# Patient Record
Sex: MALE | Race: WHITE | Employment: UNEMPLOYED | RURAL
[De-identification: names, ages, dates, MRNs, and addresses within clinical notes are randomized per-mention and may not be internally consistent; named-entity substitution may affect disease eponyms.]

---

## 2024-06-02 ENCOUNTER — HOSPITAL ENCOUNTER (INPATIENT)
Facility: HOSPITAL | Age: 39
LOS: 1 days | Discharge: HOME | DRG: 291 | End: 2024-06-06
Attending: EMERGENCY MEDICINE | Admitting: INTERNAL MEDICINE

## 2024-06-02 DIAGNOSIS — I50.9 ACUTE ON CHRONIC CONGESTIVE HEART FAILURE, UNSPECIFIED HEART FAILURE TYPE (HCC): ICD-10-CM

## 2024-06-02 DIAGNOSIS — R00.0 TACHYCARDIA: ICD-10-CM

## 2024-06-02 DIAGNOSIS — I50.9 CONGESTIVE HEART FAILURE, UNSPECIFIED HF CHRONICITY, UNSPECIFIED HEART FAILURE TYPE (HCC): Primary | ICD-10-CM

## 2024-06-02 PROCEDURE — 84484 ASSAY OF TROPONIN QUANT: CPT

## 2024-06-02 PROCEDURE — 93005 ELECTROCARDIOGRAM TRACING: CPT | Performed by: EMERGENCY MEDICINE

## 2024-06-02 PROCEDURE — 83880 ASSAY OF NATRIURETIC PEPTIDE: CPT

## 2024-06-02 PROCEDURE — 80053 COMPREHEN METABOLIC PANEL: CPT

## 2024-06-02 PROCEDURE — 36415 COLL VENOUS BLD VENIPUNCTURE: CPT

## 2024-06-02 PROCEDURE — 81001 URINALYSIS AUTO W/SCOPE: CPT

## 2024-06-02 PROCEDURE — 85025 COMPLETE CBC W/AUTO DIFF WBC: CPT

## 2024-06-02 PROCEDURE — 83735 ASSAY OF MAGNESIUM: CPT

## 2024-06-02 PROCEDURE — 99285 EMERGENCY DEPT VISIT HI MDM: CPT

## 2024-06-02 PROCEDURE — 80307 DRUG TEST PRSMV CHEM ANLYZR: CPT

## 2024-06-02 ASSESSMENT — PAIN - FUNCTIONAL ASSESSMENT: PAIN_FUNCTIONAL_ASSESSMENT: 0-10

## 2024-06-02 ASSESSMENT — PAIN SCALES - GENERAL: PAINLEVEL_OUTOF10: 2

## 2024-06-03 ENCOUNTER — APPOINTMENT (OUTPATIENT)
Facility: HOSPITAL | Age: 39
DRG: 291 | End: 2024-06-03

## 2024-06-03 PROBLEM — I10 HTN (HYPERTENSION): Chronic | Status: ACTIVE | Noted: 2024-06-03

## 2024-06-03 PROBLEM — F90.9 ADHD: Chronic | Status: ACTIVE | Noted: 2024-06-03

## 2024-06-03 PROBLEM — I50.9 CONGESTIVE HEART FAILURE OF UNKNOWN ETIOLOGY (HCC): Status: ACTIVE | Noted: 2024-06-03

## 2024-06-03 PROBLEM — E03.9 HYPOTHYROID: Chronic | Status: ACTIVE | Noted: 2024-06-03

## 2024-06-03 PROBLEM — I50.23 ACUTE ON CHRONIC SYSTOLIC (CONGESTIVE) HEART FAILURE (HCC): Chronic | Status: ACTIVE | Noted: 2024-06-03

## 2024-06-03 PROBLEM — I50.9 ACUTE CONGESTIVE HEART FAILURE, UNSPECIFIED HEART FAILURE TYPE (HCC): Status: ACTIVE | Noted: 2024-06-03

## 2024-06-03 LAB
ALBUMIN SERPL-MCNC: 3.2 G/DL (ref 3.5–5)
ALBUMIN/GLOB SERPL: 0.9 (ref 1.1–2.2)
ALP SERPL-CCNC: 153 U/L (ref 45–117)
ALT SERPL-CCNC: 46 U/L (ref 12–78)
AMPHET UR QL SCN: POSITIVE
ANION GAP SERPL CALC-SCNC: 10 MMOL/L (ref 5–15)
ANION GAP SERPL CALC-SCNC: 9 MMOL/L (ref 5–15)
APPEARANCE UR: CLEAR
AST SERPL-CCNC: 49 U/L (ref 15–37)
BACTERIA URNS QL MICRO: NEGATIVE /HPF
BARBITURATES UR QL SCN: NEGATIVE
BASOPHILS # BLD: 0.1 K/UL (ref 0–0.1)
BASOPHILS # BLD: 0.2 K/UL (ref 0–0.1)
BASOPHILS NFR BLD: 1 % (ref 0–1)
BASOPHILS NFR BLD: 1 % (ref 0–1)
BENZODIAZ UR QL: NEGATIVE
BILIRUB SERPL-MCNC: 2.2 MG/DL (ref 0.2–1)
BILIRUB UR QL: NEGATIVE
BUN SERPL-MCNC: 19 MG/DL (ref 6–20)
BUN SERPL-MCNC: 20 MG/DL (ref 6–20)
BUN/CREAT SERPL: 17 (ref 12–20)
BUN/CREAT SERPL: 17 (ref 12–20)
CALCIUM SERPL-MCNC: 8.5 MG/DL (ref 8.5–10.1)
CALCIUM SERPL-MCNC: 9 MG/DL (ref 8.5–10.1)
CANNABINOIDS UR QL SCN: POSITIVE
CHLORIDE SERPL-SCNC: 100 MMOL/L (ref 97–108)
CHLORIDE SERPL-SCNC: 98 MMOL/L (ref 97–108)
CO2 SERPL-SCNC: 25 MMOL/L (ref 21–32)
CO2 SERPL-SCNC: 28 MMOL/L (ref 21–32)
COCAINE UR QL SCN: NEGATIVE
COLOR UR: ABNORMAL
CREAT SERPL-MCNC: 1.1 MG/DL (ref 0.7–1.3)
CREAT SERPL-MCNC: 1.18 MG/DL (ref 0.7–1.3)
D DIMER PPP FEU-MCNC: 1.91 MG/L FEU (ref 0–0.65)
DIFFERENTIAL METHOD BLD: ABNORMAL
DIFFERENTIAL METHOD BLD: ABNORMAL
ECHO AO ROOT DIAM: 3.4 CM
ECHO AV PEAK GRADIENT: 3 MMHG
ECHO AV PEAK VELOCITY: 0.9 M/S
ECHO EST RA PRESSURE: 15 MMHG
ECHO LA DIAMETER: 5.3 CM
ECHO LA TO AORTIC ROOT RATIO: 1.56
ECHO LA VOL A-L A2C: 132 ML (ref 18–58)
ECHO LA VOL A-L A4C: 120 ML (ref 18–58)
ECHO LA VOL MOD A2C: 127 ML (ref 18–58)
ECHO LA VOL MOD A4C: 111 ML (ref 18–58)
ECHO LA VOLUME AREA LENGTH: 132 ML
ECHO LV EDV A2C: 222 ML
ECHO LV EDV A4C: 220 ML
ECHO LV EDV BP: 226 ML (ref 67–155)
ECHO LV EJECTION FRACTION A2C: 21 %
ECHO LV EJECTION FRACTION A4C: 19 %
ECHO LV EJECTION FRACTION BIPLANE: 19 % (ref 55–100)
ECHO LV ESV A2C: 175 ML
ECHO LV ESV A4C: 178 ML
ECHO LV ESV BP: 183 ML (ref 22–58)
ECHO LV FRACTIONAL SHORTENING: 12 % (ref 28–44)
ECHO LV INTERNAL DIMENSION DIASTOLIC: 6 CM (ref 4.2–5.9)
ECHO LV INTERNAL DIMENSION SYSTOLIC: 5.3 CM
ECHO LV IVSD: 1.2 CM (ref 0.6–1)
ECHO LV MASS 2D: 350.1 G (ref 88–224)
ECHO LV POSTERIOR WALL DIASTOLIC: 1.4 CM (ref 0.6–1)
ECHO LV RELATIVE WALL THICKNESS RATIO: 0.47
ECHO LVOT AREA: 4.2 CM2
ECHO LVOT DIAM: 2.3 CM
ECHO PULMONARY ARTERY END DIASTOLIC PRESSURE: 6 MMHG
ECHO PULMONARY ARTERY SYSTOLIC PRESSURE (PASP): 59 MMHG
ECHO PV REGURGITANT MAX VELOCITY: 1.3 M/S
ECHO RA AREA 4C: 29.4 CM2
ECHO RIGHT VENTRICULAR SYSTOLIC PRESSURE (RVSP): 34 MMHG
ECHO RV BASAL DIMENSION: 5.6 CM
ECHO RV TAPSE: 1.3 CM (ref 1.7–?)
ECHO TV REGURGITANT MAX VELOCITY: 2.16 M/S
ECHO TV REGURGITANT PEAK GRADIENT: 20 MMHG
EKG ATRIAL RATE: 112 BPM
EKG DIAGNOSIS: NORMAL
EKG P AXIS: 63 DEGREES
EKG P-R INTERVAL: 152 MS
EKG Q-T INTERVAL: 358 MS
EKG QRS DURATION: 106 MS
EKG QTC CALCULATION (BAZETT): 488 MS
EKG R AXIS: 26 DEGREES
EKG T AXIS: -64 DEGREES
EKG VENTRICULAR RATE: 112 BPM
EOSINOPHIL # BLD: 0.2 K/UL (ref 0–0.4)
EOSINOPHIL # BLD: 0.2 K/UL (ref 0–0.4)
EOSINOPHIL NFR BLD: 1 % (ref 0–7)
EOSINOPHIL NFR BLD: 1 % (ref 0–7)
EPITH CASTS URNS QL MICRO: ABNORMAL /LPF
ERYTHROCYTE [DISTWIDTH] IN BLOOD BY AUTOMATED COUNT: 18.7 % (ref 11.5–14.5)
ERYTHROCYTE [DISTWIDTH] IN BLOOD BY AUTOMATED COUNT: 18.9 % (ref 11.5–14.5)
FLUAV RNA SPEC QL NAA+PROBE: NOT DETECTED
FLUBV RNA SPEC QL NAA+PROBE: NOT DETECTED
GLOBULIN SER CALC-MCNC: 3.7 G/DL (ref 2–4)
GLUCOSE SERPL-MCNC: 130 MG/DL (ref 65–100)
GLUCOSE SERPL-MCNC: 90 MG/DL (ref 65–100)
GLUCOSE UR STRIP.AUTO-MCNC: NEGATIVE MG/DL
HCT VFR BLD AUTO: 47.7 % (ref 36.6–50.3)
HCT VFR BLD AUTO: 49.1 % (ref 36.6–50.3)
HGB BLD-MCNC: 14.2 G/DL (ref 12.1–17)
HGB BLD-MCNC: 15 G/DL (ref 12.1–17)
HGB UR QL STRIP: NEGATIVE
HYALINE CASTS URNS QL MICRO: ABNORMAL /LPF (ref 0–5)
IMM GRANULOCYTES # BLD AUTO: 0 K/UL (ref 0–0.04)
IMM GRANULOCYTES # BLD AUTO: 0.1 K/UL (ref 0–0.04)
IMM GRANULOCYTES NFR BLD AUTO: 0 % (ref 0–0.5)
IMM GRANULOCYTES NFR BLD AUTO: 1 % (ref 0–0.5)
KETONES UR QL STRIP.AUTO: NEGATIVE MG/DL
LEUKOCYTE ESTERASE UR QL STRIP.AUTO: NEGATIVE
LYMPHOCYTES # BLD: 2.3 K/UL (ref 0.8–3.5)
LYMPHOCYTES # BLD: 3.1 K/UL (ref 0.8–3.5)
LYMPHOCYTES NFR BLD: 19 % (ref 12–49)
LYMPHOCYTES NFR BLD: 20 % (ref 12–49)
Lab: ABNORMAL
MAGNESIUM SERPL-MCNC: 1.9 MG/DL (ref 1.6–2.4)
MAGNESIUM SERPL-MCNC: 1.9 MG/DL (ref 1.6–2.4)
MCH RBC QN AUTO: 22 PG (ref 26–34)
MCH RBC QN AUTO: 22.2 PG (ref 26–34)
MCHC RBC AUTO-ENTMCNC: 29.8 G/DL (ref 30–36.5)
MCHC RBC AUTO-ENTMCNC: 30.5 G/DL (ref 30–36.5)
MCV RBC AUTO: 72.5 FL (ref 80–99)
MCV RBC AUTO: 74 FL (ref 80–99)
METHADONE UR QL: NEGATIVE
MONOCYTES # BLD: 0.7 K/UL (ref 0–1)
MONOCYTES # BLD: 1.1 K/UL (ref 0–1)
MONOCYTES NFR BLD: 6 % (ref 5–13)
MONOCYTES NFR BLD: 7 % (ref 5–13)
NEUTS SEG # BLD: 11.8 K/UL (ref 1.8–8)
NEUTS SEG # BLD: 8 K/UL (ref 1.8–8)
NEUTS SEG NFR BLD: 71 % (ref 32–75)
NEUTS SEG NFR BLD: 71 % (ref 32–75)
NITRITE UR QL STRIP.AUTO: NEGATIVE
NRBC # BLD: 0 K/UL (ref 0–0.01)
NRBC # BLD: 0 K/UL (ref 0–0.01)
NRBC BLD-RTO: 0 PER 100 WBC
NRBC BLD-RTO: 0 PER 100 WBC
NT PRO BNP: 8253 PG/ML (ref 0–125)
OPIATES UR QL: NEGATIVE
PCP UR QL: NEGATIVE
PH UR STRIP: 6 (ref 5–8)
PLATELET # BLD AUTO: 157 K/UL (ref 150–400)
PLATELET # BLD AUTO: 169 K/UL (ref 150–400)
PMV BLD AUTO: 10.3 FL (ref 8.9–12.9)
PMV BLD AUTO: 10.7 FL (ref 8.9–12.9)
POTASSIUM SERPL-SCNC: 4.7 MMOL/L (ref 3.5–5.1)
POTASSIUM SERPL-SCNC: 5 MMOL/L (ref 3.5–5.1)
PROT SERPL-MCNC: 6.9 G/DL (ref 6.4–8.2)
PROT UR STRIP-MCNC: ABNORMAL MG/DL
RBC # BLD AUTO: 6.45 M/UL (ref 4.1–5.7)
RBC # BLD AUTO: 6.77 M/UL (ref 4.1–5.7)
RBC #/AREA URNS HPF: ABNORMAL /HPF (ref 0–5)
SARS-COV-2 RNA RESP QL NAA+PROBE: NOT DETECTED
SODIUM SERPL-SCNC: 134 MMOL/L (ref 136–145)
SODIUM SERPL-SCNC: 136 MMOL/L (ref 136–145)
SP GR UR REFRACTOMETRY: 1.02 (ref 1–1.03)
TROPONIN I SERPL HS-MCNC: 75 NG/L (ref 0–76)
TROPONIN I SERPL HS-MCNC: 76 NG/L (ref 0–76)
TROPONIN I SERPL HS-MCNC: 86 NG/L (ref 0–76)
URINE CULTURE IF INDICATED: ABNORMAL
UROBILINOGEN UR QL STRIP.AUTO: 1 EU/DL (ref 0.2–1)
WBC # BLD AUTO: 11.2 K/UL (ref 4.1–11.1)
WBC # BLD AUTO: 16.4 K/UL (ref 4.1–11.1)
WBC URNS QL MICRO: ABNORMAL /HPF (ref 0–4)

## 2024-06-03 PROCEDURE — 6370000000 HC RX 637 (ALT 250 FOR IP): Performed by: INTERNAL MEDICINE

## 2024-06-03 PROCEDURE — G0378 HOSPITAL OBSERVATION PER HR: HCPCS

## 2024-06-03 PROCEDURE — 87636 SARSCOV2 & INF A&B AMP PRB: CPT

## 2024-06-03 PROCEDURE — 6360000002 HC RX W HCPCS: Performed by: INTERNAL MEDICINE

## 2024-06-03 PROCEDURE — 96375 TX/PRO/DX INJ NEW DRUG ADDON: CPT

## 2024-06-03 PROCEDURE — 96374 THER/PROPH/DIAG INJ IV PUSH: CPT

## 2024-06-03 PROCEDURE — 71275 CT ANGIOGRAPHY CHEST: CPT

## 2024-06-03 PROCEDURE — 93306 TTE W/DOPPLER COMPLETE: CPT

## 2024-06-03 PROCEDURE — 6360000002 HC RX W HCPCS: Performed by: EMERGENCY MEDICINE

## 2024-06-03 PROCEDURE — 2580000003 HC RX 258: Performed by: INTERNAL MEDICINE

## 2024-06-03 PROCEDURE — 6360000004 HC RX CONTRAST MEDICATION: Performed by: EMERGENCY MEDICINE

## 2024-06-03 PROCEDURE — 96376 TX/PRO/DX INJ SAME DRUG ADON: CPT

## 2024-06-03 PROCEDURE — 96372 THER/PROPH/DIAG INJ SC/IM: CPT

## 2024-06-03 PROCEDURE — 83735 ASSAY OF MAGNESIUM: CPT

## 2024-06-03 PROCEDURE — 85379 FIBRIN DEGRADATION QUANT: CPT

## 2024-06-03 PROCEDURE — 84439 ASSAY OF FREE THYROXINE: CPT

## 2024-06-03 PROCEDURE — 71045 X-RAY EXAM CHEST 1 VIEW: CPT

## 2024-06-03 PROCEDURE — 85025 COMPLETE CBC W/AUTO DIFF WBC: CPT

## 2024-06-03 PROCEDURE — 84443 ASSAY THYROID STIM HORMONE: CPT

## 2024-06-03 RX ORDER — POTASSIUM CHLORIDE 7.45 MG/ML
10 INJECTION INTRAVENOUS PRN
Status: DISCONTINUED | OUTPATIENT
Start: 2024-06-03 | End: 2024-06-03

## 2024-06-03 RX ORDER — CARVEDILOL 6.25 MG/1
6.25 TABLET ORAL 2 TIMES DAILY WITH MEALS
Status: DISCONTINUED | OUTPATIENT
Start: 2024-06-03 | End: 2024-06-05

## 2024-06-03 RX ORDER — SODIUM CHLORIDE 9 MG/ML
INJECTION, SOLUTION INTRAVENOUS PRN
Status: DISCONTINUED | OUTPATIENT
Start: 2024-06-03 | End: 2024-06-05

## 2024-06-03 RX ORDER — ENOXAPARIN SODIUM 100 MG/ML
40 INJECTION SUBCUTANEOUS DAILY
Status: DISCONTINUED | OUTPATIENT
Start: 2024-06-03 | End: 2024-06-06 | Stop reason: HOSPADM

## 2024-06-03 RX ORDER — UREA 10 %
5 LOTION (ML) TOPICAL NIGHTLY
Status: DISCONTINUED | OUTPATIENT
Start: 2024-06-03 | End: 2024-06-06 | Stop reason: HOSPADM

## 2024-06-03 RX ORDER — SODIUM CHLORIDE 0.9 % (FLUSH) 0.9 %
5-40 SYRINGE (ML) INJECTION EVERY 12 HOURS SCHEDULED
Status: DISCONTINUED | OUTPATIENT
Start: 2024-06-03 | End: 2024-06-06 | Stop reason: HOSPADM

## 2024-06-03 RX ORDER — LISINOPRIL 40 MG/1
40 TABLET ORAL DAILY
Status: DISCONTINUED | OUTPATIENT
Start: 2024-06-03 | End: 2024-06-06 | Stop reason: HOSPADM

## 2024-06-03 RX ORDER — MAGNESIUM SULFATE IN WATER 40 MG/ML
2000 INJECTION, SOLUTION INTRAVENOUS PRN
Status: DISCONTINUED | OUTPATIENT
Start: 2024-06-03 | End: 2024-06-03

## 2024-06-03 RX ORDER — POLYETHYLENE GLYCOL 3350 17 G/17G
17 POWDER, FOR SOLUTION ORAL DAILY PRN
Status: DISCONTINUED | OUTPATIENT
Start: 2024-06-03 | End: 2024-06-05

## 2024-06-03 RX ORDER — ONDANSETRON 2 MG/ML
4 INJECTION INTRAMUSCULAR; INTRAVENOUS
Status: COMPLETED | OUTPATIENT
Start: 2024-06-03 | End: 2024-06-03

## 2024-06-03 RX ORDER — ONDANSETRON 2 MG/ML
4 INJECTION INTRAMUSCULAR; INTRAVENOUS ONCE
Status: DISCONTINUED | OUTPATIENT
Start: 2024-06-03 | End: 2024-06-03

## 2024-06-03 RX ORDER — ACETAMINOPHEN 325 MG/1
650 TABLET ORAL EVERY 6 HOURS PRN
Status: DISCONTINUED | OUTPATIENT
Start: 2024-06-03 | End: 2024-06-06 | Stop reason: HOSPADM

## 2024-06-03 RX ORDER — ONDANSETRON 2 MG/ML
4 INJECTION INTRAMUSCULAR; INTRAVENOUS EVERY 6 HOURS PRN
Status: DISCONTINUED | OUTPATIENT
Start: 2024-06-03 | End: 2024-06-03

## 2024-06-03 RX ORDER — ONDANSETRON 4 MG/1
4 TABLET, ORALLY DISINTEGRATING ORAL EVERY 8 HOURS PRN
Status: DISCONTINUED | OUTPATIENT
Start: 2024-06-03 | End: 2024-06-05

## 2024-06-03 RX ORDER — ACETAMINOPHEN 650 MG/1
650 SUPPOSITORY RECTAL EVERY 6 HOURS PRN
Status: DISCONTINUED | OUTPATIENT
Start: 2024-06-03 | End: 2024-06-05

## 2024-06-03 RX ORDER — SODIUM CHLORIDE 0.9 % (FLUSH) 0.9 %
5-40 SYRINGE (ML) INJECTION PRN
Status: DISCONTINUED | OUTPATIENT
Start: 2024-06-03 | End: 2024-06-06 | Stop reason: HOSPADM

## 2024-06-03 RX ORDER — POTASSIUM CHLORIDE 750 MG/1
40 TABLET, FILM COATED, EXTENDED RELEASE ORAL PRN
Status: DISCONTINUED | OUTPATIENT
Start: 2024-06-03 | End: 2024-06-03

## 2024-06-03 RX ORDER — LEVOTHYROXINE SODIUM 0.1 MG/1
100 TABLET ORAL
Status: DISCONTINUED | OUTPATIENT
Start: 2024-06-03 | End: 2024-06-04

## 2024-06-03 RX ORDER — FUROSEMIDE 10 MG/ML
60 INJECTION INTRAMUSCULAR; INTRAVENOUS
Status: COMPLETED | OUTPATIENT
Start: 2024-06-03 | End: 2024-06-03

## 2024-06-03 RX ORDER — CARVEDILOL 6.25 MG/1
3.12 TABLET ORAL
Status: DISCONTINUED | OUTPATIENT
Start: 2024-06-03 | End: 2024-06-03 | Stop reason: SDUPTHER

## 2024-06-03 RX ORDER — FUROSEMIDE 10 MG/ML
40 INJECTION INTRAMUSCULAR; INTRAVENOUS 2 TIMES DAILY
Status: DISCONTINUED | OUTPATIENT
Start: 2024-06-03 | End: 2024-06-05

## 2024-06-03 RX ORDER — LISINOPRIL 20 MG/1
20 TABLET ORAL
Status: DISCONTINUED | OUTPATIENT
Start: 2024-06-03 | End: 2024-06-03 | Stop reason: SDUPTHER

## 2024-06-03 RX ADMIN — ONDANSETRON 4 MG: 4 TABLET, ORALLY DISINTEGRATING ORAL at 10:32

## 2024-06-03 RX ADMIN — FUROSEMIDE 60 MG: 10 INJECTION, SOLUTION INTRAMUSCULAR; INTRAVENOUS at 00:30

## 2024-06-03 RX ADMIN — SODIUM CHLORIDE, PRESERVATIVE FREE 10 ML: 5 INJECTION INTRAVENOUS at 21:40

## 2024-06-03 RX ADMIN — CARVEDILOL 6.25 MG: 6.25 TABLET, FILM COATED ORAL at 09:10

## 2024-06-03 RX ADMIN — ONDANSETRON 4 MG: 2 INJECTION INTRAMUSCULAR; INTRAVENOUS at 06:44

## 2024-06-03 RX ADMIN — FUROSEMIDE 40 MG: 10 INJECTION, SOLUTION INTRAMUSCULAR; INTRAVENOUS at 17:04

## 2024-06-03 RX ADMIN — Medication 5 MG: at 21:40

## 2024-06-03 RX ADMIN — ENOXAPARIN SODIUM 40 MG: 100 INJECTION SUBCUTANEOUS at 09:09

## 2024-06-03 RX ADMIN — LEVOTHYROXINE SODIUM 100 MCG: 0.1 TABLET ORAL at 09:10

## 2024-06-03 RX ADMIN — LISINOPRIL 40 MG: 40 TABLET ORAL at 09:10

## 2024-06-03 RX ADMIN — IOPAMIDOL 100 ML: 755 INJECTION, SOLUTION INTRAVENOUS at 03:24

## 2024-06-03 ASSESSMENT — LIFESTYLE VARIABLES
HOW OFTEN DO YOU HAVE A DRINK CONTAINING ALCOHOL: NEVER
HOW MANY STANDARD DRINKS CONTAINING ALCOHOL DO YOU HAVE ON A TYPICAL DAY: PATIENT DOES NOT DRINK

## 2024-06-03 ASSESSMENT — PAIN SCALES - GENERAL: PAINLEVEL_OUTOF10: 0

## 2024-06-03 NOTE — H&P
[facility]   Admission History & Physical        6/3/2024 8:12 AM  Patient: Guanako Koenig 1985  PCP: No primary care provider on file.    HISTORY  Chief Complaint:   Chief Complaint   Patient presents with    Shortness of Breath    Chest Pain    Congestive Heart Failure       HPI: 38 y.o. male presenting for admission to [facility] for further evaluation and treatment for Acute on chronic systolic (congestive) heart failure (HCC).  He  has no past medical history on file..    Pt presents to ED with 3-4 day h/o worsening GOLDSTEIN and LE / Abd edema.  Relates to his h/o hereditary cardiomyopathy followed by Cardiology in [place].  He is up in  for work, but ran out of Lasix 3 days ago.  HE has not required hospitalization some over 1 year.  He notes his follow up testing shows recent stability.  There is not h/o MI or Surgery.  His mother and maternal grand-mother have the same dx - and are still living.  He has no c/o CP.  He denies chest congestion, cough, respiratory infection.  ED w/u most remarkable for BNP > 8000 and mild elevation of D-dimer.   CXR with cardiomegaly but not overt signs for CHF.  CTA w/o infiltrate for PE.  Pt feels better following Lasix 60mg IV about midnight.    Pt admits to use THC but not other drugs or EtOH.  He smokes about 1ppd cigarettes.  Does not use inhalers.  No GI or  c/o.  No fever or chill.  Scant sputum in ED    Past Medical History:  No past medical history on file.  HTN, Cardiomyopathy / CHF, Hypothyroid, Cigarettes, ADHD    Past Surgical History:  No past surgical history on file.  Cholecystectomy  Finger  Fx Hip pinning    Medication:  Prior to Admission medications    Not on File   Levothyroxine 100mcg daily, Lisinopril 40mg daily, Adderall XR ? Mg daily, Lasix ? 40mg daily, Kcl 10meg daily    Allergies:  No Known Allergies    Social History:   , came to  to work on [employer]  No EtOH  Cigarettes 1 ppd  Home in [place]    Family History:  +

## 2024-06-03 NOTE — ED NOTES
Admission SBAR Note  Situation/Background:     Patient is being transferred to [facility] ([facility]), Room# 138    Patient's Chief Complaint was SOB and is admitted for CHF.    CODE STATUS: Full  CSSRS: 0 - No Risk    ISOLATION/PRECAUTIONS: No  ISOLATION TYPE:     Is this a behavioral health patient? No  Has wanding been completed No  Are belongings secure? Yes    Called outstanding consults: Yes    STAT labs collected: Yes    Repeat Lactic Acid DUE? No  TIME DUE: na    All STAT orders are complete: Yes    The following personal items will be sent with the patient during transfer to the floor:     All valuables: none       ASSESSMENT:    NEURO:   NIH SCORE: 0,1-4,5-15,15-20,21-42: 0   AMEE SWALLOW SCREEN COMPLETE: No  ORIENTATION LEVEL: ORIENTATION LEVEL: Person, Place, Time, and Situation  Cognition:  appropriate decision making, appropriate for age attention/concentration, and appropriate safety awareness  Speech: shows no evidence of impairment    Is patient impulsive? No  Is patient oriented? {yes  Do they follow commands? Yes  Is the patient ambulatory? Yes    FALL RISK? No  Interventions: Implemented/recommended use of non-skid footwear    RESPIRATORY:   Is patient on oxygen? No  Oxygen therapy: room air  O2 rate: 0    CARDIAC:   Is cardiac monitoring ordered? Yes    Last Rhythm: Rhythm including paccardio: Sinus Tachycardia 121  Patient to transfer with tele box on? Yes  Infusions: Meds; iv fluids: none  LINE ACCESS: 20G Peripheral IV , Antecubital , Iv rate: prn       /GI:   Continent Bowel/Bladder? No  Urinary Output: over 1000  Chronic or Acute:   If Chronic, is it 3 days old, was it changed prior to specimen collection? Yes  Was UA with reflex sent to lab? Yes  If no, collect and send prior to transport to inpatient area.    INTEGUMENTARY:  IS THE PATIENT UNDRESSED? Yes  ARE THERE WOUNDS PRESENT? No  ARE THE WOUNDS DOCUMENTED? No    RESTRAINTS

## 2024-06-03 NOTE — ED NOTES
Dr Gallegos states to admin ordered Coreg 6.25mg, Lisinopril 40mg. Contacted Pharmacy for Synthroid.

## 2024-06-03 NOTE — ED NOTES
This writer spoke to patient about his daily medications and he states that he does take Adderall for his ADHD.

## 2024-06-03 NOTE — ED PROVIDER NOTES
[facility] EMERGENCY DEP  EMERGENCY DEPARTMENT ENCOUNTER       Pt Name: uGanako Koenig  MRN: [id number]  Birthdate 1985  Date of evaluation: 6/2/2024  Provider: Aide Cho MD   PCP: No primary care provider on file.  Note Started: 6:08 AM EDT 6/3/24     CHIEF COMPLAINT       Chief Complaint   Patient presents with    Shortness of Breath    Chest Pain    Congestive Heart Failure        HISTORY OF PRESENT ILLNESS: 1 or more elements      History From: Patient  HPI Limitations: None     Guanako Koenig is a 38 y.o. male with history of thyroidectomy and CHF who presents to the ED with chief complaint of shortness of breath and leg swelling along with low energy.  Patient reports he is from [place] and is up here in [place] working since Memorial Day.  He reports he ran out of his Lasix about 3 or 4 days ago.  He does not know his dose.  He since then he has been having increasing leg swelling and shortness of breath and decreased overall energy.  Denies any fevers or chills.  He reports that this evening he was short of breath and he he started having some chest discomfort that was mild.  He states the leg swelling and shortness of breath with the main reasons he came into the ED tonight.  Denies any fevers or chills.  Denies any cough.  Denies any history of drug use.         REVIEW OF SYSTEMS      Review of Systems     Positives and Pertinent negatives as per HPI.    PAST HISTORY     Past Medical History:  No past medical history on file.      Past Surgical History:  No past surgical history on file.    Family History:  No family history on file.    Social History:       Allergies:  No Known Allergies    CURRENT MEDICATIONS      Previous Medications    No medications on file       SCREENINGS               No data recorded        PHYSICAL EXAM      ED Triage Vitals [06/02/24 2345]   Enc Vitals Group      BP (!) 153/119      Pulse (!) 105      Respirations 20      Temp 97.7 °F (36.5 °C)      Temp Source Oral      SpO2 98

## 2024-06-03 NOTE — ASSESSMENT & PLAN NOTE
No baseline information available.  Patient ran out of his meds and presents in acute ACH exacerbation.    Plan  Continue diuresis  Echocardiogram  Monitor troponin to ensure they are not rising  Try to get outside records including home meds (no info found on Care Everywhere)

## 2024-06-03 NOTE — ED TRIAGE NOTES
Patient has CHF and is having shortness of breath. And chest discomfort. Patient last took his lasix. 4 days ago.

## 2024-06-03 NOTE — ED NOTES
Patient is coughing up phlegm. Patient requested something to drink, patient was provided with a can of diet ginger ale.

## 2024-06-03 NOTE — PLAN OF CARE
Patient accepted but not yet seen.    Congestive heart failure of unknown etiology (HCC)  No baseline information available.  Patient ran out of his meds and presents in acute ACH exacerbation.    Plan  Continue diuresis  Echocardiogram  Monitor troponin to ensure they are not rising  Try to get outside records including home meds (no info found on Care Everywhere)

## 2024-06-04 PROBLEM — Z72.0 TOBACCO ABUSE: Status: ACTIVE | Noted: 2024-06-04

## 2024-06-04 PROBLEM — I42.0 DILATED CARDIOMYOPATHY (HCC): Status: ACTIVE | Noted: 2024-06-04

## 2024-06-04 LAB
ANION GAP SERPL CALC-SCNC: 7 MMOL/L (ref 5–15)
BUN SERPL-MCNC: 22 MG/DL (ref 6–20)
BUN/CREAT SERPL: 19 (ref 12–20)
CALCIUM SERPL-MCNC: 8.3 MG/DL (ref 8.5–10.1)
CHLORIDE SERPL-SCNC: 97 MMOL/L (ref 97–108)
CHOLEST SERPL-MCNC: 99 MG/DL
CO2 SERPL-SCNC: 31 MMOL/L (ref 21–32)
CREAT SERPL-MCNC: 1.15 MG/DL (ref 0.7–1.3)
ERYTHROCYTE [DISTWIDTH] IN BLOOD BY AUTOMATED COUNT: 18.6 % (ref 11.5–14.5)
GLUCOSE SERPL-MCNC: 117 MG/DL (ref 65–100)
HCT VFR BLD AUTO: 46.2 % (ref 36.6–50.3)
HDLC SERPL-MCNC: 19 MG/DL
HDLC SERPL: 5.2 (ref 0–5)
HGB BLD-MCNC: 14 G/DL (ref 12.1–17)
LDLC SERPL CALC-MCNC: 68.6 MG/DL (ref 0–100)
MAGNESIUM SERPL-MCNC: 1.7 MG/DL (ref 1.6–2.4)
MCH RBC QN AUTO: 21.8 PG (ref 26–34)
MCHC RBC AUTO-ENTMCNC: 30.3 G/DL (ref 30–36.5)
MCV RBC AUTO: 72 FL (ref 80–99)
NRBC # BLD: 0 K/UL (ref 0–0.01)
NRBC BLD-RTO: 0 PER 100 WBC
PLATELET # BLD AUTO: 155 K/UL (ref 150–400)
PMV BLD AUTO: 10.2 FL (ref 8.9–12.9)
POTASSIUM SERPL-SCNC: 4.3 MMOL/L (ref 3.5–5.1)
RBC # BLD AUTO: 6.42 M/UL (ref 4.1–5.7)
SODIUM SERPL-SCNC: 135 MMOL/L (ref 136–145)
T4 FREE SERPL-MCNC: 1.3 NG/DL (ref 0.8–1.5)
TRIGL SERPL-MCNC: 57 MG/DL
TROPONIN I SERPL HS-MCNC: 89 NG/L (ref 0–76)
TSH SERPL DL<=0.05 MIU/L-ACNC: 9.27 UIU/ML (ref 0.36–3.74)
VLDLC SERPL CALC-MCNC: 11.4 MG/DL
WBC # BLD AUTO: 13 K/UL (ref 4.1–11.1)

## 2024-06-04 PROCEDURE — 83735 ASSAY OF MAGNESIUM: CPT

## 2024-06-04 PROCEDURE — 85027 COMPLETE CBC AUTOMATED: CPT

## 2024-06-04 PROCEDURE — 2580000003 HC RX 258: Performed by: INTERNAL MEDICINE

## 2024-06-04 PROCEDURE — 84484 ASSAY OF TROPONIN QUANT: CPT

## 2024-06-04 PROCEDURE — 6360000002 HC RX W HCPCS: Performed by: INTERNAL MEDICINE

## 2024-06-04 PROCEDURE — 94760 N-INVAS EAR/PLS OXIMETRY 1: CPT

## 2024-06-04 PROCEDURE — 99254 IP/OBS CNSLTJ NEW/EST MOD 60: CPT | Performed by: INTERNAL MEDICINE

## 2024-06-04 PROCEDURE — 80048 BASIC METABOLIC PNL TOTAL CA: CPT

## 2024-06-04 PROCEDURE — G0378 HOSPITAL OBSERVATION PER HR: HCPCS

## 2024-06-04 PROCEDURE — 80061 LIPID PANEL: CPT

## 2024-06-04 PROCEDURE — 36415 COLL VENOUS BLD VENIPUNCTURE: CPT

## 2024-06-04 PROCEDURE — 6370000000 HC RX 637 (ALT 250 FOR IP): Performed by: INTERNAL MEDICINE

## 2024-06-04 RX ORDER — LEVOTHYROXINE SODIUM 0.12 MG/1
125 TABLET ORAL
Status: DISCONTINUED | OUTPATIENT
Start: 2024-06-05 | End: 2024-06-06 | Stop reason: HOSPADM

## 2024-06-04 RX ADMIN — FUROSEMIDE 40 MG: 10 INJECTION, SOLUTION INTRAMUSCULAR; INTRAVENOUS at 17:43

## 2024-06-04 RX ADMIN — CARVEDILOL 6.25 MG: 6.25 TABLET, FILM COATED ORAL at 09:36

## 2024-06-04 RX ADMIN — SODIUM CHLORIDE, PRESERVATIVE FREE 10 ML: 5 INJECTION INTRAVENOUS at 17:45

## 2024-06-04 RX ADMIN — ENOXAPARIN SODIUM 40 MG: 100 INJECTION SUBCUTANEOUS at 09:36

## 2024-06-04 RX ADMIN — SODIUM CHLORIDE, PRESERVATIVE FREE 10 ML: 5 INJECTION INTRAVENOUS at 09:37

## 2024-06-04 RX ADMIN — SODIUM CHLORIDE, PRESERVATIVE FREE 5 ML: 5 INJECTION INTRAVENOUS at 21:45

## 2024-06-04 RX ADMIN — CARVEDILOL 6.25 MG: 6.25 TABLET, FILM COATED ORAL at 17:43

## 2024-06-04 RX ADMIN — LEVOTHYROXINE SODIUM 100 MCG: 0.1 TABLET ORAL at 06:12

## 2024-06-04 RX ADMIN — LISINOPRIL 40 MG: 40 TABLET ORAL at 09:36

## 2024-06-04 RX ADMIN — FUROSEMIDE 40 MG: 10 INJECTION, SOLUTION INTRAMUSCULAR; INTRAVENOUS at 09:36

## 2024-06-04 NOTE — CARE COORDINATION
Care Management Initial Assessment       RUR:n/a obs   Readmission? No  1st IM letter given? No  1st  letter given: No     06/04/24 1431   Service Assessment   Patient Orientation Alert and Oriented   Cognition Alert   History Provided By Patient   Primary Caregiver Self   Support Systems Other (Comment)  (Roommate)   Patient's Healthcare Decision Maker is: Legal Next of Kin   PCP Verified by CM Yes  (PCP in [place])   Last Visit to PCP Within last year   Prior Functional Level Independent in ADLs/IADLs   Current Functional Level Independent in ADLs/IADLs   Can patient return to prior living arrangement Yes   Ability to make needs known: Good   Family able to assist with home care needs: No   Would you like for me to discuss the discharge plan with any other family members/significant others, and if so, who? No   Financial Resources Other (Comment);Medicaid  (Patient unsure what insurance he has.)   Social/Functional History   Lives With Other (comment)  (Roommate)   Type of Home Trailer   Home Equipment None   Active  Yes   Discharge Planning   Type of Residence Trailer/Mobile Home   Living Arrangements Other (Comment)  (Roommate)   Current Services Prior To Admission None   Potential Assistance Needed N/A   DME Ordered? No   Type of Home Care Services None   Patient expects to be discharged to: Trailer/mobile home     Mr. Koengi lives in a trailer with his roommate. He is from [place]. Has insurance but couldn't remember the name of it. He states when he gets back home will call hospital and let registration know what his insurance is so they can put it in the system .He also has PCP and doctors in [place]. He is independent. Does NOT need anything after discharge. At discharge he is planning on staying in the area for about a week and then making trip back down to [place]. Told him there are no emergency contacts on file. Told him its important to have at least someone to put down but he told me he

## 2024-06-04 NOTE — PLAN OF CARE
Problem: Pain  Goal: Verbalizes/displays adequate comfort level or baseline comfort level  6/4/2024 0509 by Bettie Schaefer, RN  Outcome: Progressing  6/3/2024 2222 by Rosangela Seaman, RN  Outcome: Progressing

## 2024-06-04 NOTE — CONSULTS
does not remember what his previous ejection fractions have been.  He states he last saw his cardiologist about 3 to 4 months ago.    He presented to the ED 6/2/2024 with a 3 to 4-day history of worsening dyspnea, abdominal distention and lower extremity edema.  He ran out of his furosemide about 4 days prior to presentation and he has been eating more salty foods on his travel up from [place].  On presentation, his proBNP was 8253, chest x-ray demonstrated no acute process and high-sensitivity troponins were 76 and 86.  TSH 9.27.  He was subsequently admitted and placed on IV furosemide.  Echo yesterday demonstrated four-chamber cardiac enlargement, EF 10-15%, mild MR, dilated RV with reduced function and an estimated PASP 59 mmHg.  Chest CT revealed no pulmonary embolus.  Urine drug screen was positive for THC.  He admits to marijuana use but no other drugs.  He does not use alcohol to excess.  Since admission, he is going better with diuresis.  He is think he is quite back to baseline.  Lower extremity edema persists.      OBJECTIVE:    Current Facility-Administered Medications   Medication Dose Route Frequency    sodium chloride flush 0.9 % injection 5-40 mL  5-40 mL IntraVENous 2 times per day    sodium chloride flush 0.9 % injection 5-40 mL  5-40 mL IntraVENous PRN    0.9 % sodium chloride infusion   IntraVENous PRN    enoxaparin (LOVENOX) injection 40 mg  40 mg SubCUTAneous Daily    ondansetron (ZOFRAN-ODT) disintegrating tablet 4 mg  4 mg Oral Q8H PRN    polyethylene glycol (GLYCOLAX) packet 17 g  17 g Oral Daily PRN    acetaminophen (TYLENOL) tablet 650 mg  650 mg Oral Q6H PRN    Or    acetaminophen (TYLENOL) suppository 650 mg  650 mg Rectal Q6H PRN    melatonin tablet 5 mg  5 mg Oral Nightly    furosemide (LASIX) injection 40 mg  40 mg IntraVENous BID    carvedilol (COREG) tablet 6.25 mg  6.25 mg Oral BID WC    lisinopril (PRINIVIL;ZESTRIL) tablet 40 mg  40 mg Oral Daily    rx placeholder  1 each Other

## 2024-06-04 NOTE — CONSULTS
Nutrition Education    Educated on heart failure/low sodium diet   Learners: Patient  Readiness: Eager  Method: Explanation, Demonstration, and Handout  Response: Verbalizes Understanding  Contact name and number provided.    Patricia Ricci RD

## 2024-06-04 NOTE — ACP (ADVANCE CARE PLANNING)
Advance Care Planning     General Advance Care Planning (ACP) Conversation    Date of Conversation: 6/4/2024  Conducted with: Patient with Decision Making Capacity  Other persons present: None    Healthcare Decision Maker: No healthcare decision makers have been documented.  Click here to complete HealthCare Decision Makers including selection of the Healthcare Decision Maker Relationship (ie \"Primary\")   Today we Could NOT document next of kin. Patient states \"has no one\"     Content/Action Overview:  DECLINED ACP Conversation - will revisit periodically  N/a    Length of Voluntary ACP Conversation in minutes:  <16 minutes (Non-Billable)    Jeannie Davenport

## 2024-06-05 PROBLEM — I42.9 CARDIOMYOPATHY (HCC): Status: ACTIVE | Noted: 2024-06-05

## 2024-06-05 LAB
ANION GAP SERPL CALC-SCNC: 7 MMOL/L (ref 5–15)
BUN SERPL-MCNC: 17 MG/DL (ref 6–20)
BUN/CREAT SERPL: 17 (ref 12–20)
CALCIUM SERPL-MCNC: 8.1 MG/DL (ref 8.5–10.1)
CHLORIDE SERPL-SCNC: 100 MMOL/L (ref 97–108)
CO2 SERPL-SCNC: 32 MMOL/L (ref 21–32)
CREAT SERPL-MCNC: 1.01 MG/DL (ref 0.7–1.3)
GLUCOSE SERPL-MCNC: 96 MG/DL (ref 65–100)
MAGNESIUM SERPL-MCNC: 1.8 MG/DL (ref 1.6–2.4)
NT PRO BNP: 5119 PG/ML (ref 0–125)
POTASSIUM SERPL-SCNC: 3.7 MMOL/L (ref 3.5–5.1)
SODIUM SERPL-SCNC: 139 MMOL/L (ref 136–145)

## 2024-06-05 PROCEDURE — 2580000003 HC RX 258: Performed by: INTERNAL MEDICINE

## 2024-06-05 PROCEDURE — 99232 SBSQ HOSP IP/OBS MODERATE 35: CPT | Performed by: INTERNAL MEDICINE

## 2024-06-05 PROCEDURE — 6370000000 HC RX 637 (ALT 250 FOR IP): Performed by: INTERNAL MEDICINE

## 2024-06-05 PROCEDURE — 80048 BASIC METABOLIC PNL TOTAL CA: CPT

## 2024-06-05 PROCEDURE — 36415 COLL VENOUS BLD VENIPUNCTURE: CPT

## 2024-06-05 PROCEDURE — 94760 N-INVAS EAR/PLS OXIMETRY 1: CPT

## 2024-06-05 PROCEDURE — 1100000003 HC PRIVATE W/ TELEMETRY

## 2024-06-05 PROCEDURE — 83735 ASSAY OF MAGNESIUM: CPT

## 2024-06-05 PROCEDURE — 6360000002 HC RX W HCPCS: Performed by: INTERNAL MEDICINE

## 2024-06-05 PROCEDURE — 83880 ASSAY OF NATRIURETIC PEPTIDE: CPT

## 2024-06-05 RX ORDER — FUROSEMIDE 40 MG/1
40 TABLET ORAL DAILY
Status: DISCONTINUED | OUTPATIENT
Start: 2024-06-06 | End: 2024-06-06 | Stop reason: HOSPADM

## 2024-06-05 RX ORDER — FUROSEMIDE 40 MG/1
40 TABLET ORAL 2 TIMES DAILY
Qty: 60 TABLET | Refills: 1 | Status: SHIPPED | OUTPATIENT
Start: 2024-06-05

## 2024-06-05 RX ORDER — LEVOTHYROXINE SODIUM 0.12 MG/1
125 TABLET ORAL
Qty: 30 TABLET | Refills: 1 | Status: SHIPPED | OUTPATIENT
Start: 2024-06-06

## 2024-06-05 RX ORDER — CARVEDILOL 12.5 MG/1
12.5 TABLET ORAL 2 TIMES DAILY WITH MEALS
Status: DISCONTINUED | OUTPATIENT
Start: 2024-06-05 | End: 2024-06-05

## 2024-06-05 RX ORDER — SPIRONOLACTONE 25 MG/1
25 TABLET ORAL DAILY
Status: DISCONTINUED | OUTPATIENT
Start: 2024-06-05 | End: 2024-06-06 | Stop reason: HOSPADM

## 2024-06-05 RX ORDER — UREA 10 %
5 LOTION (ML) TOPICAL NIGHTLY
Qty: 30 TABLET | Refills: 1 | Status: SHIPPED | OUTPATIENT
Start: 2024-06-06

## 2024-06-05 RX ORDER — FUROSEMIDE 40 MG/1
40 TABLET ORAL 2 TIMES DAILY
Status: ON HOLD | COMMUNITY
End: 2024-06-05 | Stop reason: HOSPADM

## 2024-06-05 RX ORDER — SPIRONOLACTONE 25 MG/1
25 TABLET ORAL DAILY
Qty: 30 TABLET | Refills: 1 | Status: SHIPPED | OUTPATIENT
Start: 2024-06-06

## 2024-06-05 RX ORDER — LISINOPRIL 40 MG/1
40 TABLET ORAL DAILY
Qty: 30 TABLET | Refills: 1 | Status: SHIPPED | OUTPATIENT
Start: 2024-06-06

## 2024-06-05 RX ORDER — CARVEDILOL 6.25 MG/1
6.25 TABLET ORAL 2 TIMES DAILY WITH MEALS
Status: DISCONTINUED | OUTPATIENT
Start: 2024-06-05 | End: 2024-06-06 | Stop reason: HOSPADM

## 2024-06-05 RX ORDER — CARVEDILOL 6.25 MG/1
6.25 TABLET ORAL 2 TIMES DAILY WITH MEALS
Qty: 60 TABLET | Refills: 1 | Status: SHIPPED | OUTPATIENT
Start: 2024-06-06 | End: 2024-06-06

## 2024-06-05 RX ADMIN — Medication 5 MG: at 21:15

## 2024-06-05 RX ADMIN — FUROSEMIDE 40 MG: 10 INJECTION, SOLUTION INTRAMUSCULAR; INTRAVENOUS at 17:33

## 2024-06-05 RX ADMIN — ACETAMINOPHEN 650 MG: 325 TABLET ORAL at 02:11

## 2024-06-05 RX ADMIN — FUROSEMIDE 40 MG: 10 INJECTION, SOLUTION INTRAMUSCULAR; INTRAVENOUS at 09:00

## 2024-06-05 RX ADMIN — CARVEDILOL 6.25 MG: 6.25 TABLET, FILM COATED ORAL at 09:00

## 2024-06-05 RX ADMIN — LISINOPRIL 40 MG: 40 TABLET ORAL at 08:59

## 2024-06-05 RX ADMIN — SPIRONOLACTONE 25 MG: 25 TABLET ORAL at 09:00

## 2024-06-05 RX ADMIN — CARVEDILOL 6.25 MG: 6.25 TABLET, FILM COATED ORAL at 17:33

## 2024-06-05 RX ADMIN — EMPAGLIFLOZIN 10 MG: 10 TABLET, FILM COATED ORAL at 09:00

## 2024-06-05 RX ADMIN — SODIUM CHLORIDE, PRESERVATIVE FREE 10 ML: 5 INJECTION INTRAVENOUS at 21:16

## 2024-06-05 RX ADMIN — SODIUM CHLORIDE, PRESERVATIVE FREE 10 ML: 5 INJECTION INTRAVENOUS at 09:05

## 2024-06-05 RX ADMIN — ENOXAPARIN SODIUM 40 MG: 100 INJECTION SUBCUTANEOUS at 08:59

## 2024-06-05 RX ADMIN — LEVOTHYROXINE SODIUM 125 MCG: 0.12 TABLET ORAL at 06:17

## 2024-06-05 ASSESSMENT — PAIN DESCRIPTION - DESCRIPTORS: DESCRIPTORS: ACHING

## 2024-06-05 ASSESSMENT — PAIN - FUNCTIONAL ASSESSMENT: PAIN_FUNCTIONAL_ASSESSMENT: ACTIVITIES ARE NOT PREVENTED

## 2024-06-05 ASSESSMENT — PAIN SCALES - GENERAL: PAINLEVEL_OUTOF10: 5

## 2024-06-05 ASSESSMENT — PAIN DESCRIPTION - LOCATION: LOCATION: HEAD

## 2024-06-05 ASSESSMENT — PAIN DESCRIPTION - ORIENTATION: ORIENTATION: UPPER

## 2024-06-06 VITALS
HEART RATE: 102 BPM | HEIGHT: 69 IN | OXYGEN SATURATION: 96 % | DIASTOLIC BLOOD PRESSURE: 112 MMHG | TEMPERATURE: 98.1 F | SYSTOLIC BLOOD PRESSURE: 150 MMHG | BODY MASS INDEX: 27.43 KG/M2 | WEIGHT: 185.2 LBS | RESPIRATION RATE: 20 BRPM

## 2024-06-06 LAB
ANION GAP SERPL CALC-SCNC: 5 MMOL/L (ref 5–15)
BUN SERPL-MCNC: 16 MG/DL (ref 6–20)
BUN/CREAT SERPL: 15 (ref 12–20)
CALCIUM SERPL-MCNC: 8.6 MG/DL (ref 8.5–10.1)
CHLORIDE SERPL-SCNC: 99 MMOL/L (ref 97–108)
CO2 SERPL-SCNC: 34 MMOL/L (ref 21–32)
CREAT SERPL-MCNC: 1.07 MG/DL (ref 0.7–1.3)
GLUCOSE SERPL-MCNC: 105 MG/DL (ref 65–100)
MAGNESIUM SERPL-MCNC: 1.9 MG/DL (ref 1.6–2.4)
POTASSIUM SERPL-SCNC: 3.7 MMOL/L (ref 3.5–5.1)
SODIUM SERPL-SCNC: 138 MMOL/L (ref 136–145)

## 2024-06-06 PROCEDURE — 83735 ASSAY OF MAGNESIUM: CPT

## 2024-06-06 PROCEDURE — 6370000000 HC RX 637 (ALT 250 FOR IP): Performed by: INTERNAL MEDICINE

## 2024-06-06 PROCEDURE — 80048 BASIC METABOLIC PNL TOTAL CA: CPT

## 2024-06-06 PROCEDURE — 99232 SBSQ HOSP IP/OBS MODERATE 35: CPT | Performed by: INTERNAL MEDICINE

## 2024-06-06 PROCEDURE — 36415 COLL VENOUS BLD VENIPUNCTURE: CPT

## 2024-06-06 PROCEDURE — 6360000002 HC RX W HCPCS: Performed by: INTERNAL MEDICINE

## 2024-06-06 PROCEDURE — 94760 N-INVAS EAR/PLS OXIMETRY 1: CPT

## 2024-06-06 RX ORDER — AMLODIPINE BESYLATE 2.5 MG/1
2.5 TABLET ORAL DAILY
Qty: 30 TABLET | Refills: 1 | Status: SHIPPED | OUTPATIENT
Start: 2024-06-06

## 2024-06-06 RX ORDER — CARVEDILOL 6.25 MG/1
12.5 TABLET ORAL 2 TIMES DAILY WITH MEALS
Qty: 60 TABLET | Refills: 1 | Status: SHIPPED | OUTPATIENT
Start: 2024-06-06

## 2024-06-06 RX ADMIN — CARVEDILOL 6.25 MG: 6.25 TABLET, FILM COATED ORAL at 08:12

## 2024-06-06 RX ADMIN — SPIRONOLACTONE 25 MG: 25 TABLET ORAL at 08:12

## 2024-06-06 RX ADMIN — FUROSEMIDE 40 MG: 40 TABLET ORAL at 08:12

## 2024-06-06 RX ADMIN — LEVOTHYROXINE SODIUM 125 MCG: 0.12 TABLET ORAL at 08:12

## 2024-06-06 RX ADMIN — LISINOPRIL 40 MG: 40 TABLET ORAL at 08:12

## 2024-06-06 RX ADMIN — EMPAGLIFLOZIN 10 MG: 10 TABLET, FILM COATED ORAL at 08:12

## 2024-06-06 RX ADMIN — ENOXAPARIN SODIUM 40 MG: 100 INJECTION SUBCUTANEOUS at 08:13

## 2024-06-06 NOTE — DISCHARGE INSTRUCTIONS
Hospitalist Recommendations    Follow up with Dr Shantanu Portillo, Cardiology   [place] or [facility]  [phone]  /    [phone]    Carry [facility] records  Avoid heat / humidity  Hold Adderall if possible since this would have negative cardiac effects    SHERMAN CARTER MD    [phone]

## 2024-06-06 NOTE — DISCHARGE SUMMARY
[id number] mg daily  4.  Add spironolactone 25 mg daily  5.  Continue carvedilol  6.  Obtain records from his primary cardiologist in [place].  7.  If his EFs have been <35%, he should have a discussion with his primary cardiologist about a defibrillator.  8.  Recommend an overnight sleep study to evaluate for obstructive sleep apnea  9.  Recommend he discontinue Adderall  10.  Adjust thyroid dose if indicated.  Will defer to Dr. Carpenter.  11. Case discussed with Dr. Pauline SMITH MD     CARDIOLOGY CONSULT 6/5  ASSESSMENT:  1.  Acute on chronic HFrEF, improving with diuresis  2.  Dilated cardiomyopathy  3.  Hypertension  4.  Tobacco abuse  5.  Marijuana use  6.  Hypothyroidism  7.  ADHD   RECOMMENDATIONS:  1.  Continue diuresis with IV furosemide.  2.  Continue lisinopril.  He declined changing to Entresto due to financial concerns.  3.  Add [id number] mg daily  4.  Add spironolactone 25 mg daily  5.  Increase carvedilol + add Carvedilol  6.  Recommend he discontinue Adderall  7.  Discussed with Dr. Pauline SMITH MD  _______________________________________________________________________  DISCHARGE MEDICATIONS:      Medication List        START taking these medications      amLODIPine 2.5 MG tablet  Commonly known as: NORVASC  Take 1 tablet by mouth daily     carvedilol 6.25 MG tablet  Commonly known as: COREG  Take 2 tablets by mouth 2 times daily (with meals)     empagliflozin 10 MG tablet  Commonly known as: JARDIANCE  Take 1 tablet by mouth daily     levothyroxine 125 MCG tablet  Commonly known as: SYNTHROID  Take 1 tablet by mouth every morning (before breakfast)     lisinopril 40 MG tablet  Commonly known as: PRINIVIL;ZESTRIL  Take 1 tablet by mouth daily     melatonin 5 MG Tabs tablet  Take 1 tablet by mouth nightly     spironolactone 25 MG tablet  Commonly known as: ALDACTONE  Take 1 tablet by mouth daily            CHANGE how you take these medications      furosemide 40 MG tablet  Commonly 
June 6, 2024            CHANGE how you take these medications      furosemide 40 MG tablet  Commonly known as: LASIX  Take 1 tablet by mouth 2 times daily  What changed: when to take this               Where to Get Your Medications        These medications were sent to [facility] [address] [place] [address] - [address] - P [phone] - F [phone]  [address] [address]      Phone: [phone]   carvedilol 6.25 MG tablet  empagliflozin 10 MG tablet  furosemide 40 MG tablet  levothyroxine 125 MCG tablet  lisinopril 40 MG tablet  melatonin 5 MG Tabs tablet  spironolactone 25 MG tablet         My Recommended  Diet: Cardiac  Activity: [place], limited exertional effort, limited exposure to heat / humidity  Wound Care: none  Follow-up labs: at f/u with PCP / Cardiolgy    ______________________________________________________________________  DISPOSITION:    Home with Family: x   Home with HH/PT/OT/RN:    SNF/LTC:    JOSE L:    OTHER:        Condition at Discharge:  Stable  _____________________________________________________________________  Follow up with:   PCP : No primary care provider on file.  Follow-up Information    None     Making f/u with Cardiology Shantanu Portillo MD   [phone] or [phone]      Total time in minutes spent coordinating this discharge (includes going over instructions, follow-up, prescriptions, and preparing report for sign off to her PCP) :35 minutes    Signed:  Ambrose Carpenter MD  [facility] Hospitalist  [phone]

## 2024-06-06 NOTE — PLAN OF CARE
Problem: Pain  Goal: Verbalizes/displays adequate comfort level or baseline comfort level  6/6/2024 1212 by Tena Palacio LPN  Outcome: Adequate for Discharge  6/6/2024 0037 by Rachel Junior RN  Outcome: Progressing          Problem: Chronic Conditions and Co-morbidities  Goal: Patient's chronic conditions and co-morbidity symptoms are monitored and maintained or improved  6/6/2024 1212 by Tena Palacio LPN  Outcome: Adequate for Discharge  6/6/2024 0037 by Rachel Junior RN  Outcome: Progressing      n/a

## 2024-06-06 NOTE — CARE COORDINATION
06/06/24 1053   Services At/After Discharge   Transition of Care Consult (CM Consult) N/A   Services At/After Discharge None   [place] Resource Information Provided? No   Mode of Transport at Discharge Other (see comment)  ([employer]riving [employer])   Confirm Follow Up Transport Family     Mr. Koenig is being discharged home today. No needs identified. He lives at [facility] in [place]. Driving Ms. Logan will be picking him up this afternoon. He was encouraged to provide payment to [facility]; he said he would be able to provide him payment once he is dropped off (donations).       Transition of Care Plan:     RUR: 7% LOW   Prior Level of Functioning:Independent    Disposition: Home   If SNF or IPR: Date FOC offered:   Date FOC received:   Accepting facility:   Date authorization started with reference number:   Date authorization received and expires:   Follow up appointments: PATIENT TO MAKE OWN F/U [facility] IN [place].   DME needed:   Transportation at discharge:   [facility] Medicare/ letter given: n/a   Is patient a [place] and connected with VA?               If yes, was [place] transfer form completed and VA notified?   Caregiver Contact:   Discharge Caregiver contacted prior to discharge?   Care Conference needed?   Barriers to discharge: None

## 2024-06-06 NOTE — CARE COORDINATION
Transition of Care Plan:    RUR: 7% LOW   Prior Level of Functioning:   Disposition:   If SNF or IPR: Date FOC offered:   Date FOC received:   Accepting facility:   Date authorization started with reference number:   Date authorization received and expires:   Follow up appointments:   DME needed:   Transportation at discharge:   IM/[facility] Medicare/ letter given:   Is patient a [place] and connected with VA?    If yes, was  transfer form completed and VA notified?   Caregiver Contact:   Discharge Caregiver contacted prior to discharge?   Care Conference needed?   Barriers to discharge: None       1048: Made aware by primary nurse patient needs a ride home. He lives at [facility] in [place]. Spoke with patient. He states he can provide Huck a few dollars for transportation when he gets back to the trailer. Called [employer] ([employer]) he will be here between 12:30--1pm. Staff made aware of pick  up time

## 2024-06-06 NOTE — PROGRESS NOTES
Cardiology Progress note            Admit Date: 6/2/2024  Admit Diagnosis: Tachycardia [R00.0]  Congestive heart failure of unknown etiology (HCC) [I50.9]  Acute congestive heart failure, unspecified heart failure type (HCC) [I50.9]  Acute on chronic congestive heart failure, unspecified heart failure type (HCC) [I50.9]  Congestive heart failure, unspecified HF chronicity, unspecified heart failure type (HCC) [I50.9]  Cardiomyopathy (HCC) [I42.9]  Date: 6/6/2024     Time: 7:56 AM      ASSESSMENT:  1.  Acute on chronic HFrEF, improved  2.  Dilated cardiomyopathy, EF 10-15%  3.  Hypertension, suboptimal control  4.  Tobacco abuse  5.  Marijuana use  6.  Hypothyroidism  7.  ADHD    RECOMMENDATIONS:  1.  Transition IV furosemide to oral furosemide 40 mg twice daily  2.  Continue lisinopril.  He declined changing to Entresto due to financial concerns.  3.  Continue Jardiance and spironolactone.  5.  Increase carvedilol to 12.5 mg twice daily.  6.  Add amlodipine 2.5 mg daily to lower BP.  7.  Recommend he discontinue Adderall  8.  I instructed him to follow-up with his PCP or cardiologist immediately upon return to home for reevaluation of his status and follow-up chemistry panel.  9.  Case discussed with Dr. Machuca      SUBJECTIVE:  Good urine output overnight.  He feels significantly better today.  His lower extremity edema has resolved and he is breathing better.  He states he is planning on returning back to [place] via bus after discharge.  He does have some family in the area to stay with until then.    Blood pressures remain moderately elevated.  Heart rate 90 to 105 bpm, 2650 cc out over the past 24 hours.  Labs demonstrate potassium 3.7 and a stable creatinine.      OBJECTIVE:    Current Facility-Administered Medications   Medication Dose Route Frequency    empagliflozin (JARDIANCE) tablet 10 mg  10 mg Oral Daily    spironolactone 
  [facility]  Hospitalist Progress Note    NAME: Guanako Koenig   :  1985   MRN:  [id number]     Total duration of encounter: 3 days      Interim Hospital Summary: 38 y.o. male who presented on 2024 with Acute on chronic systolic (congestive) heart failure (HCC). He has no past medical history on file..       Pt admitted from ED 6/3 with c/o SOB and being off usual diuretic  ECHO noted fo ED 15%  Pt seen by Dr. Bradley - added tx Spironolactone and Jardiance  Attempted calling  [facility]  Cardiology again today - Shantanu Portillo MD   [phone]   /   [phone]  Would like to clarify change in ECHO since last one in [facility]     Subjective:     Chief Complaint / Reason for Physician Visit  \"better\".  Discussed with RN   Sleeping  Edema down / cleared    Pt planning trip back to [facility] following d/c  Discourage any idea of working on [employer] here in [place]    Review of Systems:  Symptom Y/N Comments  Symptom Y/N Comments   Fever/Chills n   Chest Pain n    Poor Appetite n   Edema y  better   Cough n   Abdominal Pain n    Sputum n   Joint Pain n    SOB/GOLDSTEIN y   Pruritis/Rash n    Nausea/vomit n   Tolerating PT/OT     Diarrhea n   Tolerating Diet y    Constipation n   Other         Current Facility-Administered Medications:     empagliflozin (JARDIANCE) tablet 10 mg, 10 mg, Oral, Daily, Nav Bradley MD, 10 mg at 24 0900    spironolactone (ALDACTONE) tablet 25 mg, 25 mg, Oral, Daily, Nav Bradley MD, 25 mg at 24 0900    carvedilol (COREG) tablet 6.25 mg, 6.25 mg, Oral, BID WC, Nav Bradley MD, 6.25 mg at 24 0900    levothyroxine (SYNTHROID) tablet 125 mcg, 125 mcg, Oral, QAM ACPauline Paul A, MD, 125 mcg at 24 0617    sodium chloride flush 0.9 % injection 5-40 mL, 5-40 mL, IntraVENous, 2 times per day, Nelson Graham MD, 10 mL at 24 0905    sodium chloride flush 0.9 % injection 5-40 mL, 5-40 mL, IntraVENous, PRN, Nelson Graham MD, 10 mL at 24 4578    
Chart accessed to print patient's AVS since his ride home is here at this time.   
Pt slept most of the shift. Up to the bathroom to void. No c/o SOB or CP.  
Daily    ondansetron (ZOFRAN-ODT) disintegrating tablet 4 mg  4 mg Oral Q8H PRN    polyethylene glycol (GLYCOLAX) packet 17 g  17 g Oral Daily PRN    acetaminophen (TYLENOL) tablet 650 mg  650 mg Oral Q6H PRN    Or    acetaminophen (TYLENOL) suppository 650 mg  650 mg Rectal Q6H PRN    melatonin tablet 5 mg  5 mg Oral Nightly    furosemide (LASIX) injection 40 mg  40 mg IntraVENous BID    lisinopril (PRINIVIL;ZESTRIL) tablet 40 mg  40 mg Oral Daily    rx placeholder  1 each Other Daily     No Known Allergies       REVIEW OF SYSTEMS:  Constitutional: Not present - Fatigue, chills, fever, weight loss, weight gain  Eyes: Not present - Visual changes, vision loss   Ears, Nose, Mouth, Throat:  Not present - Headache, earache, tinnitus, nasal congestion  Cardiovascular: as per HPI  Respiratory: Shortness of breath  Gastrointestinal: Not present - Nausea, vomiting, diarrhea, melena, hematochezia  Musculoskeletal: Not present -  Muscle pain, muscle weakness, joint pain  Endocrine: Not present - Excessive thirst, excessive urination, hair loss  Integumentary: Not present - Rashes, suspicious lesions  Neurological: Not present - Balance and gait difficulties, focal neurological symptoms  Psychiatric: Not present - Anxiety, depression, increased stressors      VITALS:  Vitals:    06/05/24 0800   BP:    Pulse: 100   Resp:    Temp:    SpO2:         PHYSICAL EXAM:  General: No distress, cooperative and alert  CV: Regular rate and rhythm, tachycardic; normal S1/S2, S3 gallop present, 1/6/ANGELITO at the apex, no rub, no JVD, normal carotid upstrokes, no carotid bruits  Respiratory: Adequate air movement with normal effort, clear to auscultation, no wheezes, no ronchi, bibasilar rales  Abdomen: Soft, nontender, nondistended, normal bowel sounds. No audible bruits.  Extremities: Warm and well perfused, normal cap refill, no clubbing or cyanosis. 1 edema bilaterally  Neuro: No focal neurologic abnormalities       DATA REVIEW:  Labs: 
76 86 (H) 89 (H)     EKG:  bpm, incomplete L BBB, no prev      Radiology:  CTA CHEST W WO CONTRAST   Final Result   There is no pulmonary embolism.   There is no aortic aneurysm or dissection.   Cardiomegaly with trace pericardial effusion and trace right-sided pleural   effusion.   Hepatic steatosis and ascites.   Incidental findings are as described above.        XR CHEST PORTABLE   Final Result   No acute intrathoracic process is identified.          ECHO 6/3:    Left Ventricle: Severely reduced left ventricular systolic function with a visually estimated EF of 10 -15%. Left ventricle is mildly dilated. Moderately increased wall thickness. Severe global hypokinesis present.    Aortic Valve: Trileaflet valve. Trace regurgitation. No stenosis.    Mitral Valve: No restricted motion. Mild regurgitation.    Right Ventricle: Right ventricle is moderately dilated. Reduced systolic function. TAPSE is abnormal. TAPSE is 1.3 cm.    Pulmonary Arteries: The estimated PASP is 59 mmHg.     CARDIOLOGY CONSULT 6/4:  ASSESSMENT:  1.  Acute on chronic HFrEF  2.  Dilated cardiomyopathy  3.  Hypertension  4.  Tobacco abuse  5.  Marijuana use  6.  Hypothyroidism  7.  ADHD   RECOMMENDATIONS:  1.  Continue diuresis with IV furosemide.  2.  I recommended transitioning lisinopril to Entresto but he declined due to financial concerns.  3.  Add [id number] mg daily  4.  Add spironolactone 25 mg daily  5.  Continue carvedilol  6.  Obtain records from his primary cardiologist in [place].  7.  If his EFs have been <35%, he should have a discussion with his primary cardiologist about a defibrillator.  8.  Recommend an overnight sleep study to evaluate for obstructive sleep apnea  9.  Recommend he discontinue Adderall  10.  Adjust thyroid dose if indicated.  Will defer to Dr. Carpenter.  11. Case discussed with Dr. Pauline SMITH MD     Procedures: see electronic medical records for all procedures/Xrays and details which were not